# Patient Record
Sex: FEMALE | Race: BLACK OR AFRICAN AMERICAN | Employment: STUDENT | URBAN - METROPOLITAN AREA
[De-identification: names, ages, dates, MRNs, and addresses within clinical notes are randomized per-mention and may not be internally consistent; named-entity substitution may affect disease eponyms.]

---

## 2019-12-23 ENCOUNTER — HOSPITAL ENCOUNTER (EMERGENCY)
Facility: CLINIC | Age: 34
Discharge: HOME | End: 2019-12-23
Attending: FAMILY MEDICINE | Admitting: FAMILY MEDICINE
Payer: COMMERCIAL

## 2019-12-23 VITALS
OXYGEN SATURATION: 98 % | HEART RATE: 85 BPM | WEIGHT: 211 LBS | SYSTOLIC BLOOD PRESSURE: 151 MMHG | TEMPERATURE: 98.7 F | RESPIRATION RATE: 15 BRPM | DIASTOLIC BLOOD PRESSURE: 86 MMHG

## 2019-12-23 DIAGNOSIS — K08.89 PAIN, DENTAL: ICD-10-CM

## 2019-12-23 PROCEDURE — 99283 EMERGENCY DEPT VISIT LOW MDM: CPT | Performed by: FAMILY MEDICINE

## 2019-12-23 PROCEDURE — 99283 EMERGENCY DEPT VISIT LOW MDM: CPT | Mod: Z6 | Performed by: FAMILY MEDICINE

## 2019-12-23 PROCEDURE — 25000132 ZZH RX MED GY IP 250 OP 250 PS 637: Performed by: FAMILY MEDICINE

## 2019-12-23 RX ORDER — CHLORHEXIDINE GLUCONATE ORAL RINSE 1.2 MG/ML
15 SOLUTION DENTAL 2 TIMES DAILY
Qty: 473 ML | Refills: 0 | Status: SHIPPED | OUTPATIENT
Start: 2019-12-23

## 2019-12-23 RX ORDER — TRAMADOL HYDROCHLORIDE 50 MG/1
50-100 TABLET ORAL EVERY 6 HOURS PRN
Qty: 15 TABLET | Refills: 0 | Status: SHIPPED | OUTPATIENT
Start: 2019-12-23

## 2019-12-23 RX ORDER — PENICILLIN V POTASSIUM 500 MG/1
500 TABLET, FILM COATED ORAL 4 TIMES DAILY
Qty: 28 TABLET | Refills: 0 | Status: SHIPPED | OUTPATIENT
Start: 2019-12-23 | End: 2019-12-30

## 2019-12-23 RX ORDER — IBUPROFEN 600 MG/1
600 TABLET, FILM COATED ORAL ONCE
Status: COMPLETED | OUTPATIENT
Start: 2019-12-23 | End: 2019-12-23

## 2019-12-23 RX ORDER — ACETAMINOPHEN 325 MG/1
650 TABLET ORAL ONCE
Status: COMPLETED | OUTPATIENT
Start: 2019-12-23 | End: 2019-12-23

## 2019-12-23 RX ADMIN — IBUPROFEN 600 MG: 600 TABLET, FILM COATED ORAL at 14:19

## 2019-12-23 RX ADMIN — ACETAMINOPHEN 650 MG: 325 TABLET, FILM COATED ORAL at 14:19

## 2019-12-23 ASSESSMENT — ENCOUNTER SYMPTOMS
VOICE CHANGE: 0
FATIGUE: 0
BRUISES/BLEEDS EASILY: 0
FEVER: 0
NUMBNESS: 0
VOMITING: 0
CHILLS: 0
CONFUSION: 0
DIFFICULTY URINATING: 0
HEADACHES: 0
DECREASED CONCENTRATION: 0
SHORTNESS OF BREATH: 0
STRIDOR: 0
FACIAL SWELLING: 0
APPETITE CHANGE: 1
TROUBLE SWALLOWING: 0
WEAKNESS: 0
ABDOMINAL PAIN: 0
NECK STIFFNESS: 0
EYE REDNESS: 0
COLOR CHANGE: 0
NAUSEA: 0
ARTHRALGIAS: 0
DYSPHORIC MOOD: 0

## 2019-12-23 NOTE — DISCHARGE INSTRUCTIONS
Home.  Take the Pen VK for infection.  Peridex rinse is good for tissue inflammation around gums.  Ultram if needed for pain.  Ibuprofen and tylenol for pain.  See Dental/Oral surgery in January as planned.  Call sooner if any problems.

## 2019-12-23 NOTE — ED AVS SNAPSHOT
[facility], [place], Emergency Department  [address]  [facility] [address]  Phone:  [phone]  Fax:  [phone]                                    Becca Schumacher   MRN: [id number]    Department:  [facility], Emergency Department   Date of Visit:  12/23/2019           After Visit Summary Signature Page    I have received my discharge instructions, and my questions have been answered. I have discussed any challenges I see with this plan with the nurse or doctor.    ..........................................................................................................................................  Patient/Patient Representative Signature      ..........................................................................................................................................  Patient Representative Print Name and Relationship to Patient    ..................................................               ................................................  Date                                   Time    ..........................................................................................................................................  Reviewed by Signature/Title    ...................................................              ..............................................  Date                                               Time          22EPIC Rev 08/18

## 2019-12-23 NOTE — ED PROVIDER NOTES
[facility] EMERGENCY DEPARTMENT ([facility])  12/23/19    History     Chief Complaint   Patient presents with     Dental Pain     left lower jaw - wisdom tooth pain.  Has appt to get extraction done in January.       History was provided by the patient and medical records.      Becca Schumacher is a generally healthy 34 year old female who presents for evaluation of dental pain.  Patient has been evaluated by dentist for left lower molar and wisdom tooth pain.  Extraction is scheduled for sometime in January.  Patient has had flareups like this before and uses salt water rinses versus antibiotics.  She denies pain in her cheek or jaw swelling.  None of the patient's wisdom teeth have been extracted.  Patient has been using ibuprofen for the pain without relief.    PAST MEDICAL HISTORY  History reviewed. No pertinent past medical history.  PAST SURGICAL HISTORY  History reviewed. No pertinent surgical history.  FAMILY HISTORY  No family history on file.  SOCIAL HISTORY  Social History     Tobacco Use     Smoking status: Not on file   Substance Use Topics     Alcohol use: Not on file     MEDICATIONS  No current facility-administered medications for this encounter.      Current Outpatient Medications   Medication     chlorhexidine (PERIDEX) 0.12 % solution     penicillin V (VEETID) 500 MG tablet     traMADol (ULTRAM) 50 MG tablet     ALLERGIES  No Known Allergies    I have reviewed the Medications, Allergies, Past Medical and Surgical History, and Social History in the Epic system.    Review of Systems   Constitutional: Positive for appetite change. Negative for chills, fatigue and fever.   HENT: Positive for dental problem. Negative for congestion, facial swelling, trouble swallowing and voice change.    Eyes: Negative for redness and visual disturbance.   Respiratory: Negative for shortness of breath and stridor.    Cardiovascular: Negative for chest pain.   Gastrointestinal: Negative for abdominal pain,  nausea and vomiting.   Genitourinary: Negative for difficulty urinating.   Musculoskeletal: Negative for arthralgias and neck stiffness.   Skin: Negative for color change.   Neurological: Negative for weakness, numbness and headaches.   Hematological: Does not bruise/bleed easily.   Psychiatric/Behavioral: Negative for confusion, decreased concentration and dysphoric mood.   All other systems reviewed and are negative.      Physical Exam   BP: (!) 151/86  Pulse: 85  Temp: 98.7  F (37.1  C)  Resp: 15  Weight: 95.7 kg (211 lb)  SpO2: 98 %      Physical Exam  Vitals signs and nursing note reviewed.   Constitutional:       General: She is in acute distress.      Appearance: She is well-developed. She is not ill-appearing, toxic-appearing or diaphoretic.      Comments: Patient currently with just minimal distress nontoxic no facial swelling   HENT:      Head: Normocephalic and atraumatic.      Mouth/Throat:      Mouth: Mucous membranes are moist.      Comments: Patient tooth #17 is partially impacted there may be some mild pericoronitis noted.  Some mild tenderness of tooth 18 also no fluctuance of the gum no trismus the floor the mouth is soft otherwise neck is supple  Eyes:      General: No scleral icterus.  Neck:      Musculoskeletal: Normal range of motion. No neck rigidity or muscular tenderness.   Cardiovascular:      Rate and Rhythm: Normal rate.   Pulmonary:      Effort: No respiratory distress.      Breath sounds: No stridor.   Musculoskeletal:         General: No swelling.   Lymphadenopathy:      Cervical: No cervical adenopathy.   Skin:     General: Skin is warm and dry.      Capillary Refill: Capillary refill takes less than 2 seconds.      Coloration: Skin is not jaundiced or pale.      Findings: No erythema or rash.   Neurological:      General: No focal deficit present.      Mental Status: She is alert and oriented to person, place, and time.   Psychiatric:         Mood and Affect: Mood normal.          Behavior: Behavior normal.         Thought Content: Thought content normal.         Judgment: Judgment normal.         ED Course   3:29 PM  The patient was seen and examined by Dr. Becca Schumacher in Room ED 19.        Procedures        In the ER patient evaluated.  With ongoing findings will treat for pericarditis along with dental infection.  Patient prescribed Pen-Vee K along with Peridex rinse and limited Ultram continue ibuprofen Tylenol follow-up with either oral surgery or dentistry as planned in January    Critical Care time:  none             Labs Ordered and Resulted from Time of ED Arrival Up to the Time of Departure from the ED - No data to display         Assessments & Plan (with Medical Decision Making)  34-year-old female presents with left lower dental pain recurrence.  Patient is planning to have her wisdom teeth removed in the meantime the left lower 1 is partially impacted.  She notes pain now for last couple days although no facial swelling or fever no difficulty swallowing nothing to indicate any type of a Mahendra's angina or other abnormalities no facial swelling will treat for both dental and pericoronitis with Pen-Vee K along with Peridex rinse and limited supply of Ultram along with ibuprofen Tylenol follow-up with her oral surgeon or dentistry return if worsening symptoms patient agrees with plan appears stable         I have reviewed the nursing notes.    I have reviewed the findings, diagnosis, plan and need for follow up with the patient.    New Prescriptions    CHLORHEXIDINE (PERIDEX) 0.12 % SOLUTION    Swish and spit 15 mLs in mouth 2 times daily    PENICILLIN V (VEETID) 500 MG TABLET    Take 1 tablet (500 mg) by mouth 4 times daily for 7 days    TRAMADOL (ULTRAM) 50 MG TABLET    Take 1-2 tablets ( mg) by mouth every 6 hours as needed for pain       Final diagnoses:   Pain, dental - #17 & 18     I, Rick Ulloa, am serving as a trained medical scribe to document services  personally performed by Wm Zayas MD, based on the provider's statements to me.      I, Wm Zayas MD, was physically present and have reviewed and verified the accuracy of this note documented by Rick Ulloa.     12/23/2019   [facility], [facility] EMERGENCY DEPARTMENT      This note was created at least in part by the use of dragon voice dictation system. Inadvertent typographical errors may still exist.  Wm Zayas MD.         Wm Zayas MD  12/23/19 4732